# Patient Record
(demographics unavailable — no encounter records)

---

## 2024-10-29 NOTE — PROCEDURE
[FreeTextEntry6] : Procedure: Bilateral nasal endoscopy (CPT 51745)   Indication: Anterior rhinoscopy was inadequate to evaluate pathology.  Left: Septum midline Moderate inferior turbinate hypertrophy  Middle meatus clear, without masses, polyps, or pus Sphenoethmoidal recess clear, without masses, polyps, or pus  Right:  Septum midline IT with mass from midportion to tail of turbinate.  Middle meatus clear, without masses, polyps, or pus  Sphenoethmoidal recess clear, without masses, polyps, or pus

## 2024-10-29 NOTE — PLAN
[TextEntry] : Will plan for resection of the lesion for both diagnostic and therapeutic purposes.   We have discussed at length the treatment options which include but are not limited to the following: observation, further medical therapy, and/or surgical intervention. Based on the face that observation and medical therapy has not resulted in resolution of symptoms and the patient has persistent sinusitis on both nasal endoscopy and radiographic imaging, the patient has decided to proceed with elective major surgery.   The procedure itself was discussed at length. The risks, benefits, and alternatives were explained in detail which include but are not limited to: anesthesia, pain, loss of smell/taste, bleeding, infection, need for nasal packing, double vision, vision loss, CSF leak requiring other procedures to repair, numbness of teeth and/or face, need for revision surgery, brain/skull base injury, a poor result, as well as unexpected/unanticipated risks. The patient understands these risks and is willing to proceed with surgery. All questions were answered.  PCP clearance.

## 2024-10-29 NOTE — REASON FOR VISIT
[Initial Evaluation] : an initial evaluation for [Ad Hoc ] : provided by an ad hoc  [FreeTextEntry2] : epistaxis [Interpreters_FullName] : Aman Sutton [Interpreters_Relationshiptopatient] :  [TWNoteComboBox1] : [place]

## 2024-10-29 NOTE — HISTORY OF PRESENT ILLNESS
[de-identified] : 51 year old male here for epistaxis s/p ED visit at [facility] 10/7/2024 for epistaxis from right nostril lasting less than five minutes 3 episodes in past 20 days, exacerbated by hot environments. Denies previous cauterization or history of bleeding disorders or blood thinners. Was seen by Dr. Fontaine who noted a right nasal floor lesions. CT and MRI are complete at  which demonstrates a right inferior turbinate lesion consistent most likely a vascular lesion.

## 2024-10-29 NOTE — HISTORY OF PRESENT ILLNESS
[de-identified] : 51 year old male here for epistaxis s/p ED visit at [facility] 10/7/2024 for epistaxis from right nostril lasting less than five minutes 3 episodes in past 20 days, exacerbated by hot environments. Denies previous cauterization or history of bleeding disorders or blood thinners. Was seen by Dr. Fontaine who noted a right nasal floor lesions. CT and MRI are complete at  which demonstrates a right inferior turbinate lesion consistent most likely a vascular lesion.

## 2024-10-29 NOTE — PROCEDURE
[FreeTextEntry6] : Procedure: Bilateral nasal endoscopy (CPT 60258)   Indication: Anterior rhinoscopy was inadequate to evaluate pathology.  Left: Septum midline Moderate inferior turbinate hypertrophy  Middle meatus clear, without masses, polyps, or pus Sphenoethmoidal recess clear, without masses, polyps, or pus  Right:  Septum midline IT with mass from midportion to tail of turbinate.  Middle meatus clear, without masses, polyps, or pus  Sphenoethmoidal recess clear, without masses, polyps, or pus

## 2024-11-18 NOTE — PROCEDURE
[FreeTextEntry6] : Procedure: Bilateral nasal endoscopy (CPT 20258)   Indication: Anterior rhinoscopy was inadequate to evaluate pathology.  Left: Septum midline Moderate inferior turbinate hypertrophy  Middle meatus clear, without masses, polyps, or pus Sphenoethmoidal recess clear, without masses, polyps, or pus  Right:  Septum midline IT with mass from midportion to tail of turbinate.  Middle meatus clear, without masses, polyps, or pus  Sphenoethmoidal recess clear, without masses, polyps, or pus

## 2024-11-18 NOTE — REASON FOR VISIT
[Subsequent Evaluation] : a subsequent evaluation for [FreeTextEntry2] : epistaxis [Ad Hoc ] : provided by an ad hoc  [Interpreters_FullName] : Aman Sutton [Interpreters_Relationshiptopatient] :  [TWNoteComboBox1] : [place]

## 2024-11-18 NOTE — HISTORY OF PRESENT ILLNESS
[de-identified] : Update 11/19/24: s/p resection of R IT mass, hemostasis applied 11/13/24. Pathology pending.  51 year old male here for epistaxis s/p ED visit at [facility] 10/7/2024 for epistaxis from right nostril lasting less than five minutes 3 episodes in past 20 days, exacerbated by hot environments. Denies previous cauterization or history of bleeding disorders or blood thinners. Was seen by Dr. Fontaine who noted a right nasal floor lesions. CT and MRI are complete at  which demonstrates a right inferior turbinate lesion consistent most likely a vascular lesion.

## 2024-11-20 NOTE — PROCEDURE
[FreeTextEntry6] : Procedure: Bilateral nasal endoscopy (CPT 95059)   Indication: Anterior rhinoscopy was inadequate to evaluate pathology.  Left: Septum midline Moderate inferior turbinate hypertrophy  Middle meatus clear, without masses, polyps, or pus Sphenoethmoidal recess clear, without masses, polyps, or pus  Right:  Septum midline IT with mass from midportion to tail of turbinate.  Middle meatus clear, without masses, polyps, or pus  Sphenoethmoidal recess clear, without masses, polyps, or pus

## 2024-11-20 NOTE — PROCEDURE
[FreeTextEntry6] : Procedure: Bilateral nasal endoscopy (CPT 19086)   Indication: Anterior rhinoscopy was inadequate to evaluate pathology.  Left: Septum midline Moderate inferior turbinate hypertrophy  Middle meatus clear, without masses, polyps, or pus Sphenoethmoidal recess clear, without masses, polyps, or pus  Right:  Septum midline IT with mass from midportion to tail of turbinate.  Middle meatus clear, without masses, polyps, or pus  Sphenoethmoidal recess clear, without masses, polyps, or pus

## 2024-11-20 NOTE — HISTORY OF PRESENT ILLNESS
[de-identified] : Update 11/21/24: s/p resection of R IT mass, hemostasis applied 11/13/24. Pathology pending.  51 year old male here for epistaxis s/p ED visit at [facility] 10/7/2024 for epistaxis from right nostril lasting less than five minutes 3 episodes in past 20 days, exacerbated by hot environments. Denies previous cauterization or history of bleeding disorders or blood thinners. Was seen by Dr. Fontaine who noted a right nasal floor lesions. CT and MRI are complete at  which demonstrates a right inferior turbinate lesion consistent most likely a vascular lesion.

## 2024-11-20 NOTE — REASON FOR VISIT
[FreeTextEntry2] : epistaxis [Interpreters_FullName] : Aman Sutton [Interpreters_Relationshiptopatient] :  [TWNoteComboBox1] : [place]

## 2024-11-20 NOTE — HISTORY OF PRESENT ILLNESS
[de-identified] : Update 11/21/24: s/p resection of R IT mass, hemostasis applied 11/13/24. Pathology pending.  51 year old male here for epistaxis s/p ED visit at [facility] 10/7/2024 for epistaxis from right nostril lasting less than five minutes 3 episodes in past 20 days, exacerbated by hot environments. Denies previous cauterization or history of bleeding disorders or blood thinners. Was seen by Dr. Fontaine who noted a right nasal floor lesions. CT and MRI are complete at  which demonstrates a right inferior turbinate lesion consistent most likely a vascular lesion.

## 2025-01-21 NOTE — HISTORY OF PRESENT ILLNESS
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 No longer using saline irrigations/gels/ointments in his nose. Notices clear anterior rhinorrhea especially when in the cold or dry heat. No epistaxis.  Update 11/21/24: s/p resection of R IT mass, hemostasis applied 11/13/24. Pathology: hemangioma. Doing well post operatively. No bleeding. Breathign well. No pain. Irrigating with saline.  51 year old male here for epistaxis s/p ED visit at [facility] 10/7/2024 for epistaxis from right nostril lasting less than five minutes 3 episodes in past 20 days, exacerbated by hot environments. Denies previous cauterization or history of bleeding disorders or blood thinners. Was seen by Dr. Fontaine who noted a right nasal floor lesions. CT and MRI are complete at  which demonstrates a right inferior turbinate lesion consistent most likely a vascular lesion.

## 2025-01-21 NOTE — REASON FOR VISIT
[Subsequent Evaluation] : a subsequent evaluation for [Ad Hoc ] : provided by an ad hoc  [FreeTextEntry2] : epistaxis [Interpreters_FullName] : Aman Sutton [Interpreters_Relationshiptopatient] :  [TWNoteComboBox1] : [place]

## 2025-01-21 NOTE — HISTORY OF PRESENT ILLNESS
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 No longer using saline irrigations/gels/ointments in his nose. Notices clear anterior rhinorrhea especially when in the cold or dry heat. No epistaxis.  Update 11/21/24: s/p resection of R IT mass, hemostasis applied 11/13/24. Pathology: hemangioma. Doing well post operatively. No bleeding. Breathign well. No pain. Irrigating with saline.  51 year old male here for epistaxis s/p ED visit at [facility] 10/7/2024 for epistaxis from right nostril lasting less than five minutes 3 episodes in past 20 days, exacerbated by hot environments. Denies previous cauterization or history of bleeding disorders or blood thinners. Was seen by Dr. Fontaine who noted a right nasal floor lesions. CT and MRI are complete at  which demonstrates a right inferior turbinate lesion consistent most likely a vascular lesion.

## 2025-01-21 NOTE — PLAN
[TextEntry] : He can use flonase PRN however discussed the side effect of epistaxis. If he experiences epistaxis, he can d/c use.  Follow up PRN.

## 2025-01-21 NOTE — PROCEDURE
[Anterior rhinoscopy insufficient to account for symptoms] : anterior rhinoscopy insufficient to account for symptoms [None] : none [de-identified] : Caleb Hollins [de-identified] : Procedure: Bilateral nasal endoscopy (CPT 60080)   Indication: Anterior rhinoscopy was inadequate to evaluate pathology.  Left: Septum midline Moderate inferior turbinate hypertrophy  Middle meatus clear, without masses, polyps, or pus Sphenoethmoidal recess clear, without masses, polyps, or pus  Right:  Septum midline IT partially resected, well healed, no hemangioma  Middle meatus clear, without masses, polyps, or pus  Sphenoethmoidal recess clear, without masses, polyps, or pus